# Patient Record
Sex: MALE | Race: WHITE | NOT HISPANIC OR LATINO | Employment: OTHER | URBAN - NONMETROPOLITAN AREA
[De-identification: names, ages, dates, MRNs, and addresses within clinical notes are randomized per-mention and may not be internally consistent; named-entity substitution may affect disease eponyms.]

---

## 2020-05-20 ENCOUNTER — OFFICE VISIT (OUTPATIENT)
Dept: CARDIOLOGY | Facility: CLINIC | Age: 60
End: 2020-05-20

## 2020-05-20 VITALS
OXYGEN SATURATION: 100 % | HEART RATE: 48 BPM | SYSTOLIC BLOOD PRESSURE: 111 MMHG | WEIGHT: 143 LBS | BODY MASS INDEX: 22.98 KG/M2 | HEIGHT: 66 IN | DIASTOLIC BLOOD PRESSURE: 75 MMHG

## 2020-05-20 DIAGNOSIS — R07.2 PRECORDIAL PAIN: ICD-10-CM

## 2020-05-20 DIAGNOSIS — I25.2 OLD MI (MYOCARDIAL INFARCTION): ICD-10-CM

## 2020-05-20 DIAGNOSIS — R06.2 WHEEZING: ICD-10-CM

## 2020-05-20 DIAGNOSIS — R06.02 SHORTNESS OF BREATH: ICD-10-CM

## 2020-05-20 DIAGNOSIS — R05.9 COUGH: ICD-10-CM

## 2020-05-20 DIAGNOSIS — I10 ESSENTIAL HYPERTENSION: ICD-10-CM

## 2020-05-20 DIAGNOSIS — E78.2 MIXED HYPERLIPIDEMIA: ICD-10-CM

## 2020-05-20 DIAGNOSIS — I48.0 PAROXYSMAL ATRIAL FIBRILLATION (HCC): Primary | ICD-10-CM

## 2020-05-20 PROBLEM — J44.9 COPD (CHRONIC OBSTRUCTIVE PULMONARY DISEASE) (HCC): Status: ACTIVE | Noted: 2020-05-20

## 2020-05-20 PROBLEM — N18.6 STAGE 5 CHRONIC KIDNEY DISEASE ON CHRONIC DIALYSIS (HCC): Status: ACTIVE | Noted: 2020-05-20

## 2020-05-20 PROBLEM — Z99.2 STAGE 5 CHRONIC KIDNEY DISEASE ON CHRONIC DIALYSIS (HCC): Status: ACTIVE | Noted: 2020-05-20

## 2020-05-20 PROBLEM — Z90.5 SINGLE KIDNEY: Status: ACTIVE | Noted: 2020-05-20

## 2020-05-20 PROCEDURE — 99204 OFFICE O/P NEW MOD 45 MIN: CPT | Performed by: SPECIALIST

## 2020-05-20 PROCEDURE — 93000 ELECTROCARDIOGRAM COMPLETE: CPT | Performed by: SPECIALIST

## 2020-05-20 RX ORDER — FOLIC ACID/VIT B COMPLEX AND C 0.8 MG
1 TABLET ORAL DAILY
COMMUNITY
Start: 2020-04-01

## 2020-05-20 RX ORDER — ACETAMINOPHEN 500 MG
1000 TABLET ORAL DAILY
COMMUNITY

## 2020-05-20 RX ORDER — APIXABAN 5 MG/1
TABLET, FILM COATED ORAL 2 TIMES DAILY
COMMUNITY
Start: 2020-04-23

## 2020-05-20 RX ORDER — SIMVASTATIN 40 MG
TABLET ORAL NIGHTLY
COMMUNITY
Start: 2020-04-29

## 2020-05-20 RX ORDER — CALCIUM ACETATE 667 MG/1
CAPSULE ORAL
COMMUNITY
Start: 2020-04-03

## 2020-05-20 RX ORDER — ALBUTEROL SULFATE 90 UG/1
AEROSOL, METERED RESPIRATORY (INHALATION)
COMMUNITY
Start: 2020-04-22

## 2020-05-20 RX ORDER — CIPROFLOXACIN 250 MG/1
125 TABLET, FILM COATED ORAL DAILY
COMMUNITY
Start: 2020-05-01

## 2020-05-20 NOTE — PROGRESS NOTES
Subjective   Initial consultation, atrial fibrillation paroxysmal  Ron Howe is a 60 y.o. male who presents to day for Establish Care (Here for eval.); Chest Pain; and Atrial Fibrillation.    CHIEF COMPLIANT  Chief Complaint   Patient presents with   • Establish Care     Here for eval.   • Chest Pain   • Atrial Fibrillation        Problem List:    1. A-Fib, on Eliquis. [id number] score = 2 ( HTN,MI)  2. HTN  3. Hyperlipidemia  4. Old MI, with reported LHC in 2012 per Dr. Parr. No report avail.   5. Shortness of breath  6. Stage 5 renal disease, on dialysis 3 x week, followed by Dr. Valdovinos  7. COPD, not followed by pulm.   8. Chest Pain        Problem List Items Addressed This Visit        Cardiovascular and Mediastinum    Paroxysmal atrial fibrillation (CMS/HCC) - Primary    Relevant Medications    metoprolol tartrate (LOPRESSOR) 25 MG tablet    Essential hypertension    Relevant Medications    metoprolol tartrate (LOPRESSOR) 25 MG tablet    Mixed hyperlipidemia    Relevant Medications    simvastatin (ZOCOR) 40 MG tablet    Old MI (myocardial infarction)    Relevant Medications    metoprolol tartrate (LOPRESSOR) 25 MG tablet       Respiratory    Shortness of breath          HPI    Patient is being referred because of history of proximal atrial fibrillation he was diagnosed first in 2016 and outside facility he was started on aspirin later on he was admitted to [facility] in January he was told that he has atrial fibrillation he did not receive cardioversion he also had end-stage renal disease and he has the right forearm fistula clotted currently he was taken urgently to  where he right now he is having catheter which should be removed later and will be starting the using the fistula again he was started on Eliquis 5 mg twice daily he also underwent cardiac addition by Dr. Parr in 2012 he was told that he has nonobstructive coronary disease but he started him on Plavix he does  have intermittent chest pain on the left side no particular related to exercise lasting for few minutes on and off he also has shortness of breath with wheezing and cough which has been longstanding he denies edema no recent palpitations no syncope the patient had end stage renal disease with he was born with only 1 kidney apparently and also he needs a urostomy permanently since he was age of 2 he does not smoke except he occasionally smokes marijuana does not drink he also has hyperlipidemia and he is taking simvastatin for the no family history of coronary disease no diabetes                PRIOR MEDS  Current Outpatient Medications on File Prior to Visit   Medication Sig Dispense Refill   • acetaminophen (TYLENOL) 500 MG tablet Take 1,000 mg by mouth Daily.     • albuterol sulfate  (90 Base) MCG/ACT inhaler prn     • ASPIRIN 81 PO Take  by mouth Daily.     • B Complex-C-Folic Acid (DAV-STEPHEN) tablet Take 1 tablet by mouth Daily.     • calcium acetate (PHOS BINDER,) 667 MG capsule capsule TAKE 4 CAPSULES BY MOUTH THREE TIMES DAILY WITH MEALS     • ciprofloxacin (CIPRO) 250 MG tablet 125 mg Daily.     • ELIQUIS 5 MG tablet tablet 2 (Two) Times a Day.     • metoprolol tartrate (LOPRESSOR) 25 MG tablet 12.5 mg Daily.     • simvastatin (ZOCOR) 40 MG tablet Every Night.     • TRELEGY ELLIPTA 100-62.5-25 MCG/INH aerosol powder  1 puff Daily.       No current facility-administered medications on file prior to visit.        ALLERGIES  Patient has no known allergies.    HISTORY  Past Medical History:   Diagnosis Date   • Atrial fibrillation (CMS/HCC)    • Chronic kidney disease     stage 5 on dialysis 3 x week, has only 1 kidney from birth   • COPD (chronic obstructive pulmonary disease) (CMS/HCC)    • Hyperlipidemia    • Hypertension    • Myocardial infarction (CMS/HCC)     1-2012       Social History     Socioeconomic History   • Marital status: Single     Spouse name: Not on file   • Number of children: Not on  "file   • Years of education: Not on file   • Highest education level: Not on file   Tobacco Use   • Smoking status: Current Some Day Smoker   • Smokeless tobacco: Never Used   • Tobacco comment: edmund.   Substance and Sexual Activity   • Alcohol use: Not Currently     Frequency: Never   • Drug use: Yes     Types: Marijuana     Comment: for medicinial purposes       Family History   Problem Relation Age of Onset   • Hypertension Mother    • No Known Problems Father        Review of Systems   Constitutional: Negative.  Negative for activity change and appetite change.   HENT: Positive for congestion.    Eyes: Positive for visual disturbance (reading glasses).   Respiratory: Positive for cough, shortness of breath and wheezing.    Cardiovascular: Positive for chest pain (under left breast area) and leg swelling (occas.). Negative for palpitations.   Gastrointestinal: Negative for blood in stool (no melena,hematuria,hemoptysis,hematochezia), constipation and diarrhea.   Endocrine: Negative.  Negative for cold intolerance and heat intolerance.   Genitourinary: Positive for decreased urine volume (has dialysis 3 x week).   Musculoskeletal: Positive for arthralgias and myalgias.   Skin: Negative.  Negative for color change and pallor.   Allergic/Immunologic: Positive for environmental allergies.   Neurological: Negative.  Negative for facial asymmetry and numbness.   Hematological: Bruises/bleeds easily (on Eliquis).   Psychiatric/Behavioral: Negative.  Negative for agitation and behavioral problems.       Objective     VITALS: /75 (BP Location: Left arm, Patient Position: Sitting)   Pulse (!) 48   Ht 167.6 cm (66\")   Wt 64.9 kg (143 lb)   SpO2 100%   BMI 23.08 kg/m²     LABS:   Lab Results (most recent)     None          IMAGING:   No Images in the past 120 days found..    EXAM:  Physical Exam   Constitutional: He is oriented to person, place, and time. He appears well-developed and well-nourished.   HENT:   "   Head: Normocephalic and atraumatic.   Eyes: Pupils are equal, round, and reactive to light.   Neck: Neck supple. No JVD present. No edema present. No thyromegaly present.   Cardiovascular: Normal rate, regular rhythm, S1 normal, S2 normal, normal heart sounds and normal pulses. PMI is not displaced. Exam reveals no gallop and no friction rub.   No murmur heard.  Right forearm fistula with thrill  Indwelling catheter on the right jugular   Pulmonary/Chest: Effort normal. No stridor. No respiratory distress. He has wheezes. He has no rales. He exhibits no tenderness.   Abdominal: Soft. Bowel sounds are normal. He exhibits no distension and no mass. There is no tenderness. There is no rebound and no guarding.   Urostomy bag   Musculoskeletal: He exhibits no edema.   Neurological: He is alert and oriented to person, place, and time. No cranial nerve deficit. Coordination normal.   Skin: Skin is warm and dry. No rash noted. No erythema. No pallor.   Psychiatric: His behavior is normal.       Procedure     ECG 12 Lead  Date/Time: 5/20/2020 3:38 PM  Performed by: Mariano Garcia MD  Authorized by: Mariano Garcia MD   Comparison: not compared with previous ECG   Previous ECG: no previous ECG available  Rhythm: sinus bradycardia  Conduction: non-specific intraventricular conduction delay        EKG performed by Tess andrade LPN     EKG: Sinus bradycardia with interventricular conduction delay otherwise unremarkable EKG no previous EKGs available for comparison  Assessment/Plan    Diagnosis Plan   1. Paroxysmal atrial fibrillation (CMS/HCC)     2. Essential hypertension     3. Mixed hyperlipidemia     4. Old MI (myocardial infarction)     5. Shortness of breath     1.  No records available we will try to get the records the patient had apparently proximal atrial fibrillation so he will be required to be in Eliquis permanently as he is CHADS VASc score of 2 we will get an echocardiogram to assess his cardiac function or  motion and valve morphology  2.  Regarding his intermittent chest pain will get his stress test to assess for ischemia  3.  We will get lipid profile and CMP result  4.  Refer him to Dr. Munson as he has significant wheezing on exam for assessment of his pulmonary status  5.  We will try to get the old records for further assessment    No follow-ups on file.    Ron was seen today for establish care, chest pain and atrial fibrillation.    Diagnoses and all orders for this visit:    Paroxysmal atrial fibrillation (CMS/HCC)    Essential hypertension    Mixed hyperlipidemia    Old MI (myocardial infarction)    Shortness of breath                 MEDS ORDERED DURING VISIT:  No orders of the defined types were placed in this encounter.        This document has been electronically signed by Mariano Garcia MD  May 20, 2020 15:18

## 2020-05-21 ENCOUNTER — TELEPHONE (OUTPATIENT)
Dept: CARDIOLOGY | Facility: CLINIC | Age: 60
End: 2020-05-21

## 2020-06-23 ENCOUNTER — APPOINTMENT (OUTPATIENT)
Dept: CARDIOLOGY | Facility: HOSPITAL | Age: 60
End: 2020-06-23